# Patient Record
Sex: FEMALE | NOT HISPANIC OR LATINO
[De-identification: names, ages, dates, MRNs, and addresses within clinical notes are randomized per-mention and may not be internally consistent; named-entity substitution may affect disease eponyms.]

---

## 2019-12-24 ENCOUNTER — APPOINTMENT (OUTPATIENT)
Dept: OTOLARYNGOLOGY | Facility: CLINIC | Age: 34
End: 2019-12-24
Payer: COMMERCIAL

## 2019-12-24 VITALS
WEIGHT: 98 LBS | HEIGHT: 62 IN | DIASTOLIC BLOOD PRESSURE: 53 MMHG | SYSTOLIC BLOOD PRESSURE: 98 MMHG | BODY MASS INDEX: 18.03 KG/M2 | HEART RATE: 68 BPM

## 2019-12-24 DIAGNOSIS — J34.2 DEVIATED NASAL SEPTUM: ICD-10-CM

## 2019-12-24 DIAGNOSIS — J31.0 CHRONIC RHINITIS: ICD-10-CM

## 2019-12-24 DIAGNOSIS — Z78.9 OTHER SPECIFIED HEALTH STATUS: ICD-10-CM

## 2019-12-24 PROBLEM — Z00.00 ENCOUNTER FOR PREVENTIVE HEALTH EXAMINATION: Status: ACTIVE | Noted: 2019-12-24

## 2019-12-24 PROCEDURE — 31231 NASAL ENDOSCOPY DX: CPT

## 2019-12-24 PROCEDURE — 99203 OFFICE O/P NEW LOW 30 MIN: CPT | Mod: 25

## 2019-12-24 RX ORDER — BACILLUS COAGULANS/INULIN 1B-250 MG
CAPSULE ORAL
Refills: 0 | Status: ACTIVE | COMMUNITY

## 2019-12-24 RX ORDER — UBIDECARENONE/VIT E ACET 100MG-5
CAPSULE ORAL
Refills: 0 | Status: ACTIVE | COMMUNITY

## 2019-12-24 NOTE — ASSESSMENT
[FreeTextEntry1] : Nasal septal deviation to the right, narrow nasal airway, patient reports she failed to respond to sprays in the past. Patient plans septorhinoplasty on January 3, followup as needed

## 2019-12-24 NOTE — HISTORY OF PRESENT ILLNESS
[de-identified] : Patient reports long-standing history of right and left nasal obstruction, failed to respond to sprays several years ago. Has not been on his sprays recently. No nonallergic or infectious history. She is here for verification of septal deviation prior to septorhinoplasty on January 3 scheduled with Dr. Sandip Noonan. She has never had a CT scan of her sinuses.  Denies history of nasal trauma or previous nasal surgery